# Patient Record
Sex: FEMALE | URBAN - METROPOLITAN AREA
[De-identification: names, ages, dates, MRNs, and addresses within clinical notes are randomized per-mention and may not be internally consistent; named-entity substitution may affect disease eponyms.]

---

## 2019-05-31 ENCOUNTER — OFFICE VISIT (OUTPATIENT)
Dept: URBAN - METROPOLITAN AREA CLINIC 23 | Facility: CLINIC | Age: 69
End: 2019-05-31
Payer: MEDICARE

## 2019-05-31 DIAGNOSIS — T26.11XA: Primary | ICD-10-CM

## 2019-05-31 PROCEDURE — 99202 OFFICE O/P NEW SF 15 MIN: CPT | Performed by: OPTOMETRIST

## 2019-05-31 RX ORDER — OFLOXACIN 3 MG/ML
0.3 % SOLUTION/ DROPS OPHTHALMIC
Qty: 1 | Refills: 0 | Status: ACTIVE
Start: 2019-05-31

## 2019-05-31 ASSESSMENT — INTRAOCULAR PRESSURE
OD: 15
OS: 14

## 2019-05-31 NOTE — IMPRESSION/PLAN
Impression: Burn of cornea of right eye, initial encounter: T26.11XA. Plan: Discussed findings. Discharge seen. Recommend pt use ofloxacin QID x 1 week. Monitor.